# Patient Record
Sex: FEMALE | Race: WHITE | HISPANIC OR LATINO
[De-identification: names, ages, dates, MRNs, and addresses within clinical notes are randomized per-mention and may not be internally consistent; named-entity substitution may affect disease eponyms.]

---

## 2017-02-27 ENCOUNTER — TRANSCRIPTION ENCOUNTER (OUTPATIENT)
Age: 42
End: 2017-02-27

## 2017-03-02 ENCOUNTER — TRANSCRIPTION ENCOUNTER (OUTPATIENT)
Age: 42
End: 2017-03-02

## 2017-08-09 ENCOUNTER — APPOINTMENT (OUTPATIENT)
Dept: ORTHOPEDIC SURGERY | Facility: CLINIC | Age: 42
End: 2017-08-09
Payer: COMMERCIAL

## 2017-08-09 DIAGNOSIS — M25.562 PAIN IN LEFT KNEE: ICD-10-CM

## 2017-08-09 DIAGNOSIS — Z78.9 OTHER SPECIFIED HEALTH STATUS: ICD-10-CM

## 2017-08-09 DIAGNOSIS — F32.9 MAJOR DEPRESSIVE DISORDER, SINGLE EPISODE, UNSPECIFIED: ICD-10-CM

## 2017-08-09 DIAGNOSIS — Z82.49 FAMILY HISTORY OF ISCHEMIC HEART DISEASE AND OTHER DISEASES OF THE CIRCULATORY SYSTEM: ICD-10-CM

## 2017-08-09 DIAGNOSIS — S83.281A OTHER TEAR OF LATERAL MENISCUS, CURRENT INJURY, RIGHT KNEE, INITIAL ENCOUNTER: ICD-10-CM

## 2017-08-09 PROCEDURE — 73564 X-RAY EXAM KNEE 4 OR MORE: CPT | Mod: LT

## 2017-08-09 PROCEDURE — 99203 OFFICE O/P NEW LOW 30 MIN: CPT

## 2017-08-09 RX ORDER — NORETHINDRONE ACETATE/ETHINYL ESTRADIOL 1MG-20MCG
KIT ORAL
Refills: 0 | Status: ACTIVE | COMMUNITY

## 2017-08-09 RX ORDER — LEVOCETIRIZINE DIHYDROCHLORIDE 5 MG/1
5 TABLET, FILM COATED ORAL
Refills: 0 | Status: ACTIVE | COMMUNITY

## 2022-08-15 VITALS
TEMPERATURE: 99 F | RESPIRATION RATE: 15 BRPM | HEIGHT: 66 IN | HEART RATE: 83 BPM | OXYGEN SATURATION: 97 % | DIASTOLIC BLOOD PRESSURE: 81 MMHG | WEIGHT: 136.03 LBS | SYSTOLIC BLOOD PRESSURE: 126 MMHG

## 2022-08-15 PROCEDURE — 99284 EMERGENCY DEPT VISIT MOD MDM: CPT | Mod: 25

## 2022-08-15 PROCEDURE — 93010 ELECTROCARDIOGRAM REPORT: CPT | Mod: NC

## 2022-08-15 NOTE — ED ADULT NURSE NOTE - OBJECTIVE STATEMENT
pt received aaox3 c/o choking on steak at aprox 8:30pm and feeling the sensation that is struck there since. pt has had several episodes of vomiting and is coughing frequently. Pt speaking in full sentences, SpO2 97% on room air.

## 2022-08-15 NOTE — ED ADULT TRIAGE NOTE - CHIEF COMPLAINT QUOTE
FB in throat. pt was having steak, and still feels it in her throat. pt also states that she was been vomiting. able to speak in clear sentences.

## 2022-08-16 ENCOUNTER — INPATIENT (INPATIENT)
Facility: HOSPITAL | Age: 47
LOS: 0 days | Discharge: HOME | DRG: 395 | End: 2022-08-16
Attending: GENERAL ACUTE CARE HOSPITAL | Admitting: GENERAL ACUTE CARE HOSPITAL
Payer: COMMERCIAL

## 2022-08-16 VITALS
HEART RATE: 78 BPM | TEMPERATURE: 98 F | DIASTOLIC BLOOD PRESSURE: 78 MMHG | RESPIRATION RATE: 17 BRPM | SYSTOLIC BLOOD PRESSURE: 118 MMHG | OXYGEN SATURATION: 99 %

## 2022-08-16 LAB
ALBUMIN SERPL ELPH-MCNC: 4.7 G/DL — SIGNIFICANT CHANGE UP (ref 3.3–5)
ALP SERPL-CCNC: 55 U/L — SIGNIFICANT CHANGE UP (ref 40–120)
ALT FLD-CCNC: 18 U/L — SIGNIFICANT CHANGE UP (ref 10–45)
ANION GAP SERPL CALC-SCNC: 11 MMOL/L — SIGNIFICANT CHANGE UP (ref 5–17)
APTT BLD: 27.8 SEC — SIGNIFICANT CHANGE UP (ref 27.5–35.5)
AST SERPL-CCNC: 18 U/L — SIGNIFICANT CHANGE UP (ref 10–40)
BASOPHILS # BLD AUTO: 0.1 K/UL — SIGNIFICANT CHANGE UP (ref 0–0.2)
BASOPHILS NFR BLD AUTO: 1.1 % — SIGNIFICANT CHANGE UP (ref 0–2)
BILIRUB SERPL-MCNC: <0.2 MG/DL — SIGNIFICANT CHANGE UP (ref 0.2–1.2)
BLD GP AB SCN SERPL QL: NEGATIVE — SIGNIFICANT CHANGE UP
BUN SERPL-MCNC: 10 MG/DL — SIGNIFICANT CHANGE UP (ref 7–23)
CALCIUM SERPL-MCNC: 9.1 MG/DL — SIGNIFICANT CHANGE UP (ref 8.4–10.5)
CHLORIDE SERPL-SCNC: 105 MMOL/L — SIGNIFICANT CHANGE UP (ref 96–108)
CO2 SERPL-SCNC: 24 MMOL/L — SIGNIFICANT CHANGE UP (ref 22–31)
CREAT SERPL-MCNC: 0.8 MG/DL — SIGNIFICANT CHANGE UP (ref 0.5–1.3)
EGFR: 91 ML/MIN/1.73M2 — SIGNIFICANT CHANGE UP
EOSINOPHIL # BLD AUTO: 0.2 K/UL — SIGNIFICANT CHANGE UP (ref 0–0.5)
EOSINOPHIL NFR BLD AUTO: 2.3 % — SIGNIFICANT CHANGE UP (ref 0–6)
GLUCOSE SERPL-MCNC: 105 MG/DL — HIGH (ref 70–99)
HCG SERPL-ACNC: <0 MIU/ML — SIGNIFICANT CHANGE UP
HCT VFR BLD CALC: 35.4 % — SIGNIFICANT CHANGE UP (ref 34.5–45)
HGB BLD-MCNC: 11.9 G/DL — SIGNIFICANT CHANGE UP (ref 11.5–15.5)
IMM GRANULOCYTES NFR BLD AUTO: 0.2 % — SIGNIFICANT CHANGE UP (ref 0–1.5)
INR BLD: 0.94 — SIGNIFICANT CHANGE UP (ref 0.88–1.16)
LYMPHOCYTES # BLD AUTO: 1.79 K/UL — SIGNIFICANT CHANGE UP (ref 1–3.3)
LYMPHOCYTES # BLD AUTO: 20.2 % — SIGNIFICANT CHANGE UP (ref 13–44)
MCHC RBC-ENTMCNC: 30.7 PG — SIGNIFICANT CHANGE UP (ref 27–34)
MCHC RBC-ENTMCNC: 33.6 GM/DL — SIGNIFICANT CHANGE UP (ref 32–36)
MCV RBC AUTO: 91.5 FL — SIGNIFICANT CHANGE UP (ref 80–100)
MONOCYTES # BLD AUTO: 0.5 K/UL — SIGNIFICANT CHANGE UP (ref 0–0.9)
MONOCYTES NFR BLD AUTO: 5.6 % — SIGNIFICANT CHANGE UP (ref 2–14)
NEUTROPHILS # BLD AUTO: 6.26 K/UL — SIGNIFICANT CHANGE UP (ref 1.8–7.4)
NEUTROPHILS NFR BLD AUTO: 70.6 % — SIGNIFICANT CHANGE UP (ref 43–77)
NRBC # BLD: 0 /100 WBCS — SIGNIFICANT CHANGE UP (ref 0–0)
PLATELET # BLD AUTO: 334 K/UL — SIGNIFICANT CHANGE UP (ref 150–400)
POTASSIUM SERPL-MCNC: 3.6 MMOL/L — SIGNIFICANT CHANGE UP (ref 3.5–5.3)
POTASSIUM SERPL-SCNC: 3.6 MMOL/L — SIGNIFICANT CHANGE UP (ref 3.5–5.3)
PROT SERPL-MCNC: 7 G/DL — SIGNIFICANT CHANGE UP (ref 6–8.3)
PROTHROM AB SERPL-ACNC: 11.2 SEC — SIGNIFICANT CHANGE UP (ref 10.5–13.4)
RBC # BLD: 3.87 M/UL — SIGNIFICANT CHANGE UP (ref 3.8–5.2)
RBC # FLD: 12.5 % — SIGNIFICANT CHANGE UP (ref 10.3–14.5)
RH IG SCN BLD-IMP: NEGATIVE — SIGNIFICANT CHANGE UP
SARS-COV-2 RNA SPEC QL NAA+PROBE: NEGATIVE — SIGNIFICANT CHANGE UP
SODIUM SERPL-SCNC: 140 MMOL/L — SIGNIFICANT CHANGE UP (ref 135–145)
WBC # BLD: 8.87 K/UL — SIGNIFICANT CHANGE UP (ref 3.8–10.5)
WBC # FLD AUTO: 8.87 K/UL — SIGNIFICANT CHANGE UP (ref 3.8–10.5)

## 2022-08-16 PROCEDURE — 36415 COLL VENOUS BLD VENIPUNCTURE: CPT

## 2022-08-16 PROCEDURE — 86901 BLOOD TYPING SEROLOGIC RH(D): CPT

## 2022-08-16 PROCEDURE — 85610 PROTHROMBIN TIME: CPT

## 2022-08-16 PROCEDURE — 71046 X-RAY EXAM CHEST 2 VIEWS: CPT | Mod: 26

## 2022-08-16 PROCEDURE — 96376 TX/PRO/DX INJ SAME DRUG ADON: CPT

## 2022-08-16 PROCEDURE — 86900 BLOOD TYPING SEROLOGIC ABO: CPT

## 2022-08-16 PROCEDURE — 99285 EMERGENCY DEPT VISIT HI MDM: CPT | Mod: 25

## 2022-08-16 PROCEDURE — 71046 X-RAY EXAM CHEST 2 VIEWS: CPT

## 2022-08-16 PROCEDURE — 80053 COMPREHEN METABOLIC PANEL: CPT

## 2022-08-16 PROCEDURE — 85025 COMPLETE CBC W/AUTO DIFF WBC: CPT

## 2022-08-16 PROCEDURE — 96374 THER/PROPH/DIAG INJ IV PUSH: CPT

## 2022-08-16 PROCEDURE — 85730 THROMBOPLASTIN TIME PARTIAL: CPT

## 2022-08-16 PROCEDURE — 84702 CHORIONIC GONADOTROPIN TEST: CPT

## 2022-08-16 PROCEDURE — 87635 SARS-COV-2 COVID-19 AMP PRB: CPT

## 2022-08-16 PROCEDURE — 96375 TX/PRO/DX INJ NEW DRUG ADDON: CPT

## 2022-08-16 PROCEDURE — 86850 RBC ANTIBODY SCREEN: CPT

## 2022-08-16 RX ORDER — GLUCAGON INJECTION, SOLUTION 0.5 MG/.1ML
1 INJECTION, SOLUTION SUBCUTANEOUS ONCE
Refills: 0 | Status: COMPLETED | OUTPATIENT
Start: 2022-08-16 | End: 2022-08-16

## 2022-08-16 RX ORDER — ONDANSETRON 8 MG/1
4 TABLET, FILM COATED ORAL ONCE
Refills: 0 | Status: COMPLETED | OUTPATIENT
Start: 2022-08-16 | End: 2022-08-16

## 2022-08-16 RX ADMIN — GLUCAGON INJECTION, SOLUTION 1 MILLIGRAM(S): 0.5 INJECTION, SOLUTION SUBCUTANEOUS at 02:02

## 2022-08-16 RX ADMIN — ONDANSETRON 4 MILLIGRAM(S): 8 TABLET, FILM COATED ORAL at 02:03

## 2022-08-16 RX ADMIN — GLUCAGON INJECTION, SOLUTION 1 MILLIGRAM(S): 0.5 INJECTION, SOLUTION SUBCUTANEOUS at 00:47

## 2022-08-16 RX ADMIN — Medication 0.5 MILLIGRAM(S): at 02:03

## 2022-08-16 NOTE — ED PROVIDER NOTE - CLINICAL SUMMARY MEDICAL DECISION MAKING FREE TEXT BOX
trial glucagon  iv hydration  antiemetics prn  GI consult as needed if no resolution w glucagon pt otherwise healthy here w food impaction. steak. since approx 8-830 pm. tolerating secretions. painful swallowing. multiple episodes of vomiting. spitting up.   pt appears uncomfortable. sitting upright. coughing / gagging but tolerating secretions. no difficulty breathing.  cxr, pre-op labs  trial glucagon  iv hydration  antiemetics prn  GI consult as needed if no resolution w glucagon

## 2022-08-16 NOTE — ED PROVIDER NOTE - PATIENT PORTAL LINK FT
You can access the FollowMyHealth Patient Portal offered by [facility] by registering at the following website: [email or web address]. By joining [employer]’s FollowMyHealth portal, you will also be able to view your health information using other applications (apps) compatible with our system.

## 2022-08-16 NOTE — ED PROVIDER NOTE - PHYSICAL EXAMINATION
Constitutional : appears uncomfortable, sitting upright.. but nontoxic. awake, alert, oriented to person, place, time/situation.  Head : head normocephalic, atraumatic  EENMT : eyes clear bilaterally, PERRL, EOMI. airway patent. moist mucous membranes. neck supple. tolerating secretions. frequent cough / gagging, spitting up.  Cardiac : Normal rate, regular rhythm. No murmur appreciated, no LE edema.  Resp : Breath sounds clear and equal bilaterally. Respirations even and unlabored.   Gastro : abdomen soft, nontender, nondistended. no rebound or guarding.   MSK :  range of motion is not limited, no muscle or joint tenderness  Vasc : Extremities warm and well perfused. 2+ radial and DP pulses. cap refill <2 seconds  Neuro : Alert and oriented, CNII-XII grossly intact, no focal deficits, no motor or sensory deficits.  Skin : Skin normal color for race, warm, dry and intact. No evidence of rash.  Psych : Alert and oriented to person, place, time/situation. normal mood and affect. no apparent risk to self or others.

## 2022-08-16 NOTE — ED PROVIDER NOTE - NSFOLLOWUPINSTRUCTIONS_ED_ALL_ED_FT
You were seen today for a food bolus impaction.  See below for general instructions.   Your symptoms improved after medication in the ED.  Follow up with Gastroenterology for continued evaluation. See office information listed here.     _________________________________      Food Impaction    WHAT YOU NEED TO KNOW:    What is food impaction? Food impaction occurs when food (often meat or fish bones) becomes stuck in your esophagus. Food impaction can occur if your esophagus does not function normally. Food impaction may also happen if you do not have teeth or do not chew your food completely.     How is food impaction diagnosed and treated? Your healthcare provider will ask what your symptoms are, when they began, and what you recently ate. You may need an x-ray to help locate the food in your esophagus. You may need the following treatments:   •Medicines may be given to relax your esophagus. This may help food pass into your stomach.      •Endoscopy is a procedure in which a scope (thin, flexible tube with a light) is used to examine your upper gastrointestinal tract. Devices, such as forceps, are used during endoscopy to pull out the food. Your healthcare provider may gently push the food through your esophagus and into your stomach. He may send tissue from your esophagus to the lab for tests.      How can I prevent another episode of food impaction?   •Sit up while you eat. Raise the head of your bed if you need to eat in bed.      •Make sure your dentures fit well. Poorly fitting dentures make it difficult to chew.       •Chew your food completely. Proper chewing will help with swallowing.       •Drink water with meals. Water will help move food down to your stomach.       •Follow up with your healthcare provider. You may need tests to check your ability to swallow.       When should I seek immediate care?   •You are unable to swallow your saliva.      •Your breathing is noisy.      •You have trouble breathing.       •You have repeated vomiting or blood in your vomit.      •You have pain in your chest or abdomen.      When should I contact my healthcare provider?   •You feel you have something stuck in your throat.      •You choke or vomit after eating.      •You have questions or concerns about your condition or care.

## 2022-08-16 NOTE — ED PROVIDER NOTE - NS ED ATTENDING STATEMENT MOD
This was a shared visit with the DARÍO. I reviewed and verified the documentation and independently performed the documented:

## 2022-08-16 NOTE — ED PROVIDER NOTE - OBJECTIVE STATEMENT
47 yr old female, no medial hx, presents to the emergency department w foreign body in throat. pt was eating steak dinner around 8pm. felt as though piece of steak got caught going down and now is stuck there. multiple episodes of vomiting. frequent coughing / gagging.   no hx strictures. no hx similar episodes. tolerating secretions but pain w swallowing.  no chest pain, SOB, abd pain.

## 2022-08-16 NOTE — ED PROVIDER NOTE - CARE PROVIDER_API CALL
Pankaj Chaudhry)  Gastroenterology; Internal Medicine  [address], [address] [place] [address]  Phone: [phone]  Fax: [phone]  Follow Up Time:

## 2022-08-16 NOTE — ED PROVIDER NOTE - PROGRESS NOTE DETAILS
pt w resolution of symptoms after second rounds of meds.   sleeping comfortably. no coughing or gagging. no trouble swallowing or trouble breathing.   drank giner-jennifer without n/v or needing to spit up.   will dc w outpt GI at this time. pt to come back immediately if symptoms return.     All results reviewed with the patient verbally. Discharge plan and return precautions d/w pt who verbalized understanding and agrees with plan. All questions answered. Vitals WNL. Ready for d/c. no improvement w glucagon.   labs (preop) unremarkable  cxr w/o acute findings  GI consulted - discussed w GI. recs another dose of glucagon w ativan as well. if no relief will admit to medicine and plan for scope in AM pt w resolution of symptoms after second rounds of meds.   sleeping comfortably. no coughing or gagging. no trouble swallowing or trouble breathing.   drank ginger-jennifer without n/v or needing to spit up.   will dc w outpt GI at this time. pt to come back immediately if symptoms return.     All results reviewed with the patient verbally. Discharge plan and return precautions d/w pt who verbalized understanding and agrees with plan. All questions answered. Vitals WNL. Ready for d/c.

## 2022-08-17 ENCOUNTER — TRANSCRIPTION ENCOUNTER (OUTPATIENT)
Age: 47
End: 2022-08-17

## 2022-08-18 ENCOUNTER — TRANSCRIPTION ENCOUNTER (OUTPATIENT)
Age: 47
End: 2022-08-18

## 2022-08-18 DIAGNOSIS — T18.128A FOOD IN ESOPHAGUS CAUSING OTHER INJURY, INITIAL ENCOUNTER: ICD-10-CM
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
## 2025-06-09 ENCOUNTER — LAB REQUISITION (OUTPATIENT)
Dept: LAB | Age: 50
End: 2025-06-09

## 2025-06-09 DIAGNOSIS — K52.9 NONINFECTIVE GASTROENTERITIS AND COLITIS, UNSPECIFIED: ICD-10-CM

## 2025-06-09 PROCEDURE — PSEU8465 CALPROTECTIN, FECAL: Performed by: CLINICAL MEDICAL LABORATORY

## 2025-06-09 PROCEDURE — PSEU11651 PANCREATIC ELASTASE, FECAL: Performed by: CLINICAL MEDICAL LABORATORY

## 2025-06-09 PROCEDURE — 82653 EL-1 FECAL QUANTITATIVE: CPT | Performed by: CLINICAL MEDICAL LABORATORY

## 2025-06-09 PROCEDURE — 83993 ASSAY FOR CALPROTECTIN FECAL: CPT | Performed by: CLINICAL MEDICAL LABORATORY

## 2025-06-11 LAB
CALPROTECTIN STL-MCNT: 49 UG/G
ELASTASE PANC STL-MCNT: >800 UG/G